# Patient Record
Sex: FEMALE | URBAN - METROPOLITAN AREA
[De-identification: names, ages, dates, MRNs, and addresses within clinical notes are randomized per-mention and may not be internally consistent; named-entity substitution may affect disease eponyms.]

---

## 2021-03-13 ENCOUNTER — OFFICE VISIT (OUTPATIENT)
Dept: URBAN - METROPOLITAN AREA CLINIC 7 | Facility: CLINIC | Age: 51
End: 2021-03-13

## 2021-03-13 PROCEDURE — 99205 OFFICE O/P NEW HI 60 MIN: CPT | Performed by: OPHTHALMOLOGY

## 2021-03-13 NOTE — IMPRESSION/PLAN
Impression: Macula off RD, OD. Plan: Peripheral exam reveals a macula off RD. Recommend surgery. RBA discussed. Pt elects surgery.     


PLAN: 25g PPV/ SBP/ MP / PRP / C3F8 gas OD

## 2021-03-15 ENCOUNTER — SURGERY (OUTPATIENT)
Dept: URBAN - METROPOLITAN AREA EXTERNAL CLINIC 26 | Facility: EXTERNAL CLINIC | Age: 51
End: 2021-03-15

## 2021-03-15 PROCEDURE — 67108 REPAIR DETACHED RETINA: CPT | Performed by: OPHTHALMOLOGY

## 2021-03-16 ENCOUNTER — POST-OPERATIVE VISIT (OUTPATIENT)
Dept: URBAN - METROPOLITAN AREA CLINIC 13 | Facility: CLINIC | Age: 51
End: 2021-03-16

## 2021-03-16 PROCEDURE — 99024 POSTOP FOLLOW-UP VISIT: CPT | Performed by: OPHTHALMOLOGY

## 2021-03-16 ASSESSMENT — INTRAOCULAR PRESSURE
OS: 8
OD: 12

## 2021-03-16 NOTE — IMPRESSION/PLAN
Impression: S/P 25g PPV/ SBP/ MP / PRP / C3F8 gas OD - 1 Day. 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  Plan: --retina attached
--no s/s infection
--IOP acceptable
--start PF/Oflox QID
--RD/endoph WS discussed
--alt restrictions/positioning discussed
--f/u 1 week w/DYK

## 2021-03-22 ENCOUNTER — POST-OPERATIVE VISIT (OUTPATIENT)
Dept: URBAN - METROPOLITAN AREA CLINIC 13 | Facility: CLINIC | Age: 51
End: 2021-03-22

## 2021-03-22 PROCEDURE — 99024 POSTOP FOLLOW-UP VISIT: CPT | Performed by: OPHTHALMOLOGY

## 2021-03-22 ASSESSMENT — INTRAOCULAR PRESSURE
OS: 9
OD: 11

## 2021-03-22 NOTE — IMPRESSION/PLAN
Impression: S/P 25g PPV/ SBP/ MP / PRP / C3F8 gas OD - 7 Days. 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 Excellent post op course   Post operative instructions reviewed - Condition is improving - Plan: No s/s of infection VA/IOP acceptable Does have some inferior SRF, but in setting of good SB. D/w patient that this may resolve with obs as it could be residual or that it may represent re-detachment. Stressed positioning. Rec earlier f/u in 2 weeks. Call ASAP with worsening. Post-operative instructions and precautions Reviewed. Gas pos/prec. Call ASAP with changes --Taper Prednisolone acetate 1% TID x 1 wk, BID x 1wk, QD x 1wk, then d/c
--Discontinue Ocuflox 2 weeks POS/OCT ([facility])

## 2021-04-15 ENCOUNTER — POST-OPERATIVE VISIT (OUTPATIENT)
Dept: URBAN - METROPOLITAN AREA CLINIC 13 | Facility: CLINIC | Age: 51
End: 2021-04-15

## 2021-04-15 PROCEDURE — 99024 POSTOP FOLLOW-UP VISIT: CPT | Performed by: OPHTHALMOLOGY

## 2021-04-15 ASSESSMENT — INTRAOCULAR PRESSURE
OD: 9
OS: 10

## 2021-04-15 NOTE — IMPRESSION/PLAN
Impression: S/P 25g PPV/ SBP/ MP / PRP / C3F8 gas   03/15/2021 (DYK) Plan: Retina is attached. No s/s of infection IOP is good at (9) Drops off

RTC 2 months, OCT OU

## 2021-06-24 ENCOUNTER — OFFICE VISIT (OUTPATIENT)
Dept: URBAN - METROPOLITAN AREA CLINIC 7 | Facility: CLINIC | Age: 51
End: 2021-06-24

## 2021-06-24 DIAGNOSIS — H04.123 DRY EYE SYNDROME OF BILATERAL LACRIMAL GLANDS: ICD-10-CM

## 2021-06-24 DIAGNOSIS — H33.031 RETINAL DETACHMENT WITH GIANT RETINAL TEAR, RIGHT EYE: Primary | ICD-10-CM

## 2021-06-24 PROCEDURE — 99214 OFFICE O/P EST MOD 30 MIN: CPT | Performed by: OPHTHALMOLOGY

## 2021-06-24 PROCEDURE — 92134 CPTRZ OPH DX IMG PST SGM RTA: CPT | Performed by: OPHTHALMOLOGY

## 2021-06-24 ASSESSMENT — INTRAOCULAR PRESSURE
OS: 13
OD: 12

## 2021-06-24 NOTE — IMPRESSION/PLAN
Impression: h/o RD, OD. S/P 25g PPV/ SBP/ MP / PRP / C3F8 gas   03/15/2021 (DYK) Plan: Exam and OCT reveal no ERM. The patient has done well with surgery. Observe. 

Return in 6 months, OCT OU

## 2022-02-17 ENCOUNTER — OFFICE VISIT (OUTPATIENT)
Dept: URBAN - METROPOLITAN AREA CLINIC 13 | Facility: CLINIC | Age: 52
End: 2022-02-17

## 2022-02-17 PROCEDURE — 99214 OFFICE O/P EST MOD 30 MIN: CPT | Performed by: OPHTHALMOLOGY

## 2022-02-17 PROCEDURE — 92134 CPTRZ OPH DX IMG PST SGM RTA: CPT | Performed by: OPHTHALMOLOGY

## 2022-02-17 ASSESSMENT — INTRAOCULAR PRESSURE
OD: 14
OS: 15

## 2022-02-17 NOTE — IMPRESSION/PLAN
Impression: h/o RD, OD. S/P 25g PPV/ SBP/ MP / PRP / C3F8 gas   03/15/2021 (DYK) Plan: Exam and OCT reveal no ERM. The patient has done well with surgery. Observe. The long term prognosis for vision OD is that the [place] OD will likely not improve beyond this point (about 20/100), and that the patient will always be at risk for recurrent retinal detachment. No special restrictions are necessary, but the patient is recommended to call my office with any concerning change of vision status, including floaters, flashing lights, or peripheral vision curtains. 

Return in 12 months, OCT OU